# Patient Record
Sex: FEMALE | Race: OTHER | Employment: PART TIME | URBAN - METROPOLITAN AREA
[De-identification: names, ages, dates, MRNs, and addresses within clinical notes are randomized per-mention and may not be internally consistent; named-entity substitution may affect disease eponyms.]

---

## 2017-06-07 ENCOUNTER — HOSPITAL ENCOUNTER (OUTPATIENT)
Dept: LAB | Age: 62
Discharge: HOME | End: 2017-06-07

## 2017-06-07 ENCOUNTER — OFFICE VISIT (OUTPATIENT)
Dept: FAMILY MEDICINE CLINIC | Age: 62
End: 2017-06-07

## 2017-06-07 VITALS
HEIGHT: 65 IN | HEART RATE: 85 BPM | SYSTOLIC BLOOD PRESSURE: 158 MMHG | DIASTOLIC BLOOD PRESSURE: 96 MMHG | BODY MASS INDEX: 24.32 KG/M2 | WEIGHT: 146 LBS | TEMPERATURE: 98.4 F

## 2017-06-07 DIAGNOSIS — M54.32 BILATERAL SCIATICA: Primary | ICD-10-CM

## 2017-06-07 DIAGNOSIS — M54.9 ACUTE MIDLINE BACK PAIN, UNSPECIFIED BACK LOCATION: ICD-10-CM

## 2017-06-07 DIAGNOSIS — M54.31 BILATERAL SCIATICA: ICD-10-CM

## 2017-06-07 DIAGNOSIS — M54.32 BILATERAL SCIATICA: ICD-10-CM

## 2017-06-07 DIAGNOSIS — E03.9 HYPOTHYROIDISM, UNSPECIFIED TYPE: ICD-10-CM

## 2017-06-07 DIAGNOSIS — M54.31 BILATERAL SCIATICA: Primary | ICD-10-CM

## 2017-06-07 LAB
ANION GAP BLD CALC-SCNC: 10 MMOL/L (ref 5–15)
BUN SERPL-MCNC: 12 MG/DL (ref 6–20)
BUN/CREAT SERPL: 15 (ref 12–20)
CALCIUM SERPL-MCNC: 9.5 MG/DL (ref 8.5–10.1)
CHLORIDE SERPL-SCNC: 104 MMOL/L (ref 97–108)
CO2 SERPL-SCNC: 26 MMOL/L (ref 21–32)
CREAT SERPL-MCNC: 0.79 MG/DL (ref 0.55–1.02)
GLUCOSE SERPL-MCNC: 94 MG/DL (ref 65–100)
POTASSIUM SERPL-SCNC: 4.1 MMOL/L (ref 3.5–5.1)
SODIUM SERPL-SCNC: 140 MMOL/L (ref 136–145)
TSH SERPL DL<=0.05 MIU/L-ACNC: 11.7 UIU/ML (ref 0.36–3.74)

## 2017-06-07 PROCEDURE — 84443 ASSAY THYROID STIM HORMONE: CPT | Performed by: NURSE PRACTITIONER

## 2017-06-07 PROCEDURE — 80048 BASIC METABOLIC PNL TOTAL CA: CPT | Performed by: NURSE PRACTITIONER

## 2017-06-07 RX ORDER — PREDNISONE 10 MG/1
TABLET ORAL
Qty: 21 TAB | Refills: 0 | Status: SHIPPED | OUTPATIENT
Start: 2017-06-07 | End: 2017-07-05 | Stop reason: ALTCHOICE

## 2017-06-07 RX ORDER — CYCLOBENZAPRINE HCL 10 MG
10 TABLET ORAL
Qty: 30 TAB | Refills: 0 | Status: SHIPPED | OUTPATIENT
Start: 2017-06-07 | End: 2017-11-30

## 2017-06-07 RX ORDER — LEVOTHYROXINE SODIUM 100 UG/1
100 TABLET ORAL
Qty: 30 TAB | Refills: 3 | Status: SHIPPED | OUTPATIENT
Start: 2017-06-07 | End: 2017-07-05 | Stop reason: SDUPTHER

## 2017-06-07 NOTE — PATIENT INSTRUCTIONS
Ciática: Instrucciones de cuidado - [ Sciatica: Care Instructions ]  Instrucciones de cuidado    La ciática es kamilah irritación de omi de los nervios ciáticos, que salen de la médula willett en la parte baja de la espalda. Los nervios ciáticos y brigida ramificaciones se extienden hacia abajo por la nalga hasta el pie. La ciática puede desarrollarse cuando un disco lesionado en la espalda ejerce presión contra la raíz de un nervio willett. Hilario síntoma principal es dolor, entumecimiento o debilidad que con frecuencia es peor en la pierna o el pie que en la espalda. A menudo la ciática mejora y desaparece con el paso del [place]. Un tratamiento temprano generalmente incluye medicamentos y ejercicios para aliviar el dolor. La atención de seguimiento es kamilah parte clave de hilario tratamiento y seguridad. Asegúrese de hacer y acudir a todas las citas, y llame a hilario médico si está teniendo problemas. También es kamilah buena idea saber los resultados de los exámenes y mantener kamilah lista de los medicamentos que shelia. ¿Cómo puede cuidarse en el hogar? · [employer] analgésicos (medicamentos para el dolor) exactamente tori le fueron indicados. ¨ Si el médico le recetó un analgésico, tómelo según las indicaciones. ¨ Si no está tomando un analgésico recetado, pregúntele a hilario médico si puede gabriel omi de [employer]. · Utilice calor o frío para aliviar el dolor. ¨ Para aplicar calor, póngase kamilah bolsa de agua tibia, kamilah almohadilla térmica a baja temperatura o kamilah compresa tibia sobre la espalda. No se vaya a dormir con kamilah almohadilla térmica sobre la piel. ¨ Para utilizar frío, póngase hielo o kamilah compresa fría sobre la brando mariusz un período de 10 a 20 minutos cada vez. Póngase un paño christian entre el hielo y la piel. · Evite sentarse si es posible, a menos que se sienta mejor que de pie. · Alterne entre recostarse y jessica caminatas cortas. Aumente la distancia que camina tanto tori pueda sin empeorar los síntomas.   · No arden nada que empeore los síntomas. ¿Cuándo debe pedir ayuda? Llame al 911 en cualquier momento que considere que necesita atención de [place]. Por ejemplo, llame si:  · Es completamente incapaz de  kamilah pierna. Llame a childress médico ahora mismo o busque atención médica inmediata si:  · Tiene síntomas nuevos o peores en las piernas o en las nalgas (glúteos). Los síntomas pueden incluir:  ¨ Entumecimiento u hormigueo. ¨ Debilidad. ¨ Dolor. · Pierde el control de la vejiga o del intestino. Preste especial atención a los cambios en childress joe y asegúrese de comunicarse con childress médico si:  · No mejora tori se esperaba. ¿Dónde puede encontrar más información en inglés? Ronald Toro a [email or web address] Porter C077 en la búsqueda para aprender más acerca de \"Ciática: Instrucciones de cuidado - [ Sciatica: Care Instructions ]. \"  Revisado: 23 [place], 2016  Versión del contenido: 11.2  © 1600-5447 Healthwise, Incorporated. Las instrucciones de cuidado fueron adaptadas bajo licencia por [employer] (which disclaims liability or warranty for this information). Si usted tiene [place] afección médica o sobre estas instrucciones, siempre pregunte a childress profesional de joe. Healthwise, Incorporated niega toda garantía o responsabilidad por childress uso de esta información.

## 2017-06-07 NOTE — PROGRESS NOTES
Subjective:     Chief Complaint   Patient presents with    Back Pain     Back pain. Pain radiates to her legs X about1 week        She  is a 64 y.o. female who presents for evaluation of back pain x 1 week. Pt denies any acute injury/MVA nor fall. Pt woke up one AM w/ pain that starts in her C-spine and radiates into her legs. Has been taking \"relaxers\" and NSAIDs/motrin but can't recall the name of the relaxers. Prolonged sitting/standing or activity can provoke the pain. No bowel/bladder changes. Pain approx same since onset. PMH:  hypothryoidism    Surg:  Had coccyx Fx/removal, eyelid lift/repair    NKDA    Current Rx: synthroid 100mcg    Last labs done in Feb before coming to this country. Pt is from [facility]. Pt is not currently working. Pt denies etoh, tobacco nor drug use. Pt is single, 3 children. Last mammogram 2015. Unable to recall last pap, > 3 years        ROS  Gen - no fever/chills  Resp - no dyspnea or cough  CV - no chest pain or LINARES  Rest per HPI    No past medical history on file. No past surgical history on file. No current outpatient prescriptions on file prior to visit. No current facility-administered medications on file prior to visit.          Objective:     Vitals:    06/07/17 1041 06/07/17 1043   BP: (!) 160/92 (!) 158/96   Pulse: 77 85   Temp: 98.4 °F (36.9 °C)    TempSrc: Oral    Weight:  146 lb (66.2 kg)   Height:  5' 5.25\" (1.657 m)       Physical Examination:  General appearance - alert, well appearing, and in no distress  Eyes -sclera anicteric  Neck - supple, no significant adenopathy, no thyromegaly  Chest - clear to auscultation, no wheezes, rales or rhonchi, symmetric air entry  Heart - normal rate, regular rhythm, normal S1, S2, no murmurs, rubs, clicks or gallops  Neurological - alert, oriented, no focal findings or movement disorder noted  Abdomen-BS present/WNL x 4 quads, non-tender/distended, soft,no organomegaly  M/S:  + SI tenderness, gait WNL, tender at L2-L7, upp/low ext strength 5/5    Assessment/ Plan:   Kwadwo York was seen today for back pain. Diagnoses and all orders for this visit:    Bilateral sciatica  -     predniSONE (DELTASONE) 10 mg tablet; 20mg PO BID x 3 days then 10mg PO BID x 3 days then 10mg PO daily then stop. -     cyclobenzaprine (FLEXERIL) 10 mg tablet; Take 1 Tab by mouth nightly. -     METABOLIC PANEL, BASIC; Future    Acute midline back pain, unspecified back location  -     predniSONE (DELTASONE) 10 mg tablet; 20mg PO BID x 3 days then 10mg PO BID x 3 days then 10mg PO daily then stop. -     cyclobenzaprine (FLEXERIL) 10 mg tablet; Take 1 Tab by mouth nightly. Hypothyroidism, unspecified type  -     TSH 3RD GENERATION; Future  -     levothyroxine (SYNTHROID) 100 mcg tablet; Take 1 Tab by mouth Daily (before breakfast). ? Bulge disc r/t sleep position. Prednisone taper and QHS flexeril. Discussed anticipate SEs. Counseled Pt on minimizing NSAID use r/t elevated BP. Check TSH and BMP and resume 100mcg dose. Refer to [facility] for routine pap/mammogram.     Re-eval in 3-4 weeks. I have discussed the diagnosis with the patient and the intended plan as seen in the above orders. The patient has received an after-visit summary and questions were answered concerning future plans. I have discussed medication side effects and warnings with the patient as well. The patient verbalizes understanding and agreement with the plan. Follow-up Disposition:  Return in about 4 weeks (around 7/5/2017).

## 2017-07-05 ENCOUNTER — OFFICE VISIT (OUTPATIENT)
Dept: FAMILY MEDICINE CLINIC | Age: 62
End: 2017-07-05

## 2017-07-05 VITALS
HEART RATE: 80 BPM | TEMPERATURE: 98.4 F | DIASTOLIC BLOOD PRESSURE: 80 MMHG | WEIGHT: 147 LBS | BODY MASS INDEX: 24.27 KG/M2 | SYSTOLIC BLOOD PRESSURE: 155 MMHG

## 2017-07-05 DIAGNOSIS — I73.00 RAYNAUD'S PHENOMENON WITHOUT GANGRENE: Primary | ICD-10-CM

## 2017-07-05 DIAGNOSIS — E03.9 HYPOTHYROIDISM, UNSPECIFIED TYPE: ICD-10-CM

## 2017-07-05 RX ORDER — LEVOTHYROXINE SODIUM 75 UG/1
75 TABLET ORAL
Qty: 30 TAB | Refills: 3 | Status: SHIPPED | OUTPATIENT
Start: 2017-07-05 | End: 2017-11-30 | Stop reason: DRUGHIGH

## 2017-07-05 RX ORDER — AMLODIPINE BESYLATE 5 MG/1
5 TABLET ORAL DAILY
Qty: 30 TAB | Refills: 3 | Status: SHIPPED | OUTPATIENT
Start: 2017-07-05 | End: 2017-11-30

## 2017-07-05 NOTE — MR AVS SNAPSHOT
Visit Information Tamiko Hart Personal Médico Departamento Teléfono del Dep. Número de visita 7/5/2017  2:15 PM Karina Ya, [address] [place] [address]  [phone] Follow-up Instructions Return in about 8 weeks (around 8/30/2017), or if symptoms worsen or fail to improve. Upcoming Health Maintenance Date Due Hepatitis C Screening 1955 DTaP/Tdap/Td series (1 - Tdap) 11/15/1976 PAP AKA CERVICAL CYTOLOGY 11/15/1976 BREAST CANCER SCRN MAMMOGRAM 11/15/2005 FOBT Q 1 YEAR AGE 50-75 11/15/2005 ZOSTER VACCINE AGE 60> 11/15/2015 INFLUENZA AGE 9 TO ADULT 8/1/2017 Alergias  Review Complete El: 7/5/2017 Por: NURIA Funez Clarice del:  7/5/2017 No Known Allergies Vacunas actuales Odette Cools No hay ninguna vacuna archivada. No revisadas esta visita You Were Diagnosed With   
  
 Freddy Car Raynaud's phenomenon without gangrene    -  Primary ICD-10-CM: I73.00 ICD-9-CM: 443.0 Hypothyroidism, unspecified type     ICD-10-CM: E03.9 ICD-9-CM: 244.9 Partes vitales PS Pulso Temperatura Peso (percentil de crecimiento) BMI (IMC) Estado obstétrico  
 155/80 (BP 1 Location: Left arm) 80 98.4 °F (36.9 °C) (Oral) 147 lb (66.7 kg) 24.27 kg/m2 Postmenopausal  
 Estatus de tabaquísmo Never Smoker Historial de signos vitales BMI and BSA Data Body Mass Index Body Surface Area  
 24.27 kg/m 2 1.75 m 2 [facility] Name Phone [facility] [address] [place] [address] Hilario lista de medicamentos actualizada Lista actualizada el: 7/5/17  2:36 PM.  Olga Lidia Harrison use hilario lista de medicamentos más reciente. amLODIPine 5 mg tablet También conocido tori:  Mertzon Cordial Take 1 Tab by mouth daily. cyclobenzaprine 10 mg tablet También conocido tori:  FLEXERIL  
 Take 1 Tab by mouth nightly. levothyroxine 75 mcg tablet También conocido tori:  SYNTHROID Take 1 Tab by mouth Daily (before breakfast). Recetas Enviado a la Iron Refills  
 amLODIPine (NORVASC) 5 mg tablet 3 Sig: Take 1 Tab by mouth daily. Class: Normal  
 Pharmacy: [id number] [place] [facility],[address], [facility] [facility] #: [phone] Route: Oral  
 levothyroxine (SYNTHROID) 75 mcg tablet 3 Sig: Take 1 Tab by mouth Daily (before breakfast). Class: Normal  
 Pharmacy: [address] [place] [facility],[address], [facility] [facility] #: [phone] Route: Oral  
  
Instrucciones de seguimiento Return in about 8 weeks (around 8/30/2017), or if symptoms worsen or fail to improve. Instrucciones para el Paciente Levotriroxina (Por la boca) Trata el hipotiroidismo. También trata la glándula tiroides agrandada y cáncer en la tiroides. Alcira(s) : Levoxyl, Synthroid, Tirosint, Unithroid Existen muchas otras marcas de Dueñas. Nalini medicamento no debe ser usado cuando:  
Nalini medicamento no es adecuado para todas las personas. No lo use si usted ha tenido kamilah reacción alérgica al glicerol o tuvo un ataque al corazón reciente. Flanagan de usar nalini medicamento:  
[place] · [place] brigida medicamentos tori se le haya indicado. Es probable que sea necesario cambiar childress dosis varias veces hasta encontrar la que funciona mejor para usted. Puede que tenga que gabriel Dueñas mariusz 6 a 8 semanas antes de que brigida síntomas comienzan a mejorar. · Shira y siga las instrucciones para el paciente que vienen con el medicamento. Hable con childress médico o farmacéutico si tiene alguna pregunta. · [facility] medicamento por la mañana con el estómago vacío. Espere por lo menos 30 a 60 minutos antes de comer cualquier alimento. · [place]: Tráguela entera. No la mario ni triture. · Solución oral: ¨ Nalini medicamento puede mezclarse con agua o administrarse directamente en la boca. ¨ Para mezclar con agua: Exprima el contenido de 1 ampolla de dosis [place] en un vaso o kamilah taza con agua. Revuelva y tome la mezcla inmediatamente. Añada algo de agua al vaso o la taza y tómela. [place] lo ayudará a que no quede medicamento en el vaso o la taza. No mezcle nalini medicamento con ningún otro líquido excepto agua. No la guarde para usarla más adelante. ¨ Para gabriel sin agua: Exprima el medicamento directamente en la boca o en kamilah Darshan Lavender y tráguelo inmediatamente. · Tableta: Si nalini medicamento se le está dando a un bebé o un lamont pequeño, usted puede triturar la tableta y mezclarla con 1 o 2 cucharaditas (5 a 10 mililitros) de agua. Esta mezcla se puede jessica con kamilah cuchara o un gotero. No mezcle la tableta con ningún otro líquido excepto agua. No guarde la mezcla. Si usted no da el medicamento inmediatamente después de que se [place], tirarlo a la basura. · Si olvida kamilah dosis: Si olvida kamilah dosis de childress medicamento, tómelo lo más pronto posible. Si es jose angel la hora para childress próxima dosis, espere hasta entonces para gabriel childress dosis regular. No use medicamento adicional para reponer la dosis olvidada. · Guarde el medicamento en un recipiente cerrado a temperatura ambiente y alejado del calor, la humedad y la enzo directa. Utilice el líquido oral dentro de los 15 días después de abrir la bolsa. Mantenga las ampollas en la bolsa hasta que usted esté listo para usarlas. Medicamentos y [employer] que debe evitar:  
Consulte con childress médico o farmacéutico antes de usar cualquier medicamento, incluyendo los que compra sin receta médica, las vitaminas y los productos herbales. · Algunos alimentos y medicamentos pueden afectar el funcionamiento de la levotiroxina. Informe a childress médico si está usando cualquiera de los siguientes: ¨ Amiodarona, dexametasona, digoxina, imatinib, ketamina, fenobarbital, rifampicina ¨ Medicamento con betabloqueadores ¨ Anticoagulantes (incluyendo heparina, warfarina) ¨ Insulina o un medicamento para la diabetes ¨ Medicamento para tratar la depresión · Si utiliza antiácidos, medicamentos para tratar el colesterol alto (tori colestiramina, colesevelam, colestipol), orlistat, sevelamer, sucralfato, medicamentos para el estómago (incluyendo lansoprazol, omeprazol, pantoprazol) o cualquier medicamento que contenga calcio o raquel, tómelo por lo menos 4 horas antes o 4 horas después de gabriel levotiroxina. · Harina de semilla de algodón, fibra dietética, la harina de soya (fórmula infantil), o nueces pueden disminuir la absorción de docBeat. Hable con childress médico si tiene preguntas. · No coma toronja ni tome jugo de toronja mientras esté . Precauciones mariusz el uso de Kimi medicamento: · Informe a childress médico si usted está embarazada o amamantando, o si tiene anemia, problemas de coagulación de la isaiah, diabetes, enfermedad cardíaca, osteoporosis, problemas de la glándula pituitaria o suprarrenal. Informe a childress médico si ha recibido recientemente radioterapia con yodo. · Informe a todos los médicos o dentistas que lo atiendan que usted está tomando amberly medicamento. · No suspenda el uso de amberly medicamento súbitamente. Childress médico necesitará disminuir childress dosis poco a poco antes de suspender el medicamento por completo. · El médico solicitará exámenes de laboratorio mariusz las citas de rutina para revisar los efectos de docBeat. Asista a todas brigida citas. · Guarde todos los medicamentos fuera del alcance de los niños. Nunca comparta brigida medicamentos con [facility].  
Efectos secundarios que pueden presentarse mariusz el uso de amberly medicamento:  
Consulte inmediatamente con el médico si nota cualquiera de estos efectos secundarios: 
· Reacción alérgica: Comezón o ronchas, hinchazón del [facility] o [facility], hinchazón u hormigueo en la boca o garganta, opresión en el pecho, dificultad para respirar · Dolor en el pecho que puede extenderse, dificultad para respirar, náuseas, sudoración inusual, desmayos · Ritmo cardíaco acelerado, golpeante o irregular · Convulsiones o temblores · Dolor de nicho severo, visión borrosa o doble, náusea, vómito (en niños) · Cojea al caminar, dolor en la rodilla o la cadera (en los niños) Consulte con el médico si nota los siguientes efectos secundarios menos graves: · Cambios de apetito o peso · Cambios en los periodos menstruales · Pérdida de bety · Nerviosismo, sensibilidad al calor, sudoración Consulte con el médico si nota otros efectos secundarios que brittany son causados por amberly medicamento. Llame a childress médico para consultarle Carla. Usted puede notificar brigida efectos secundarios al FDA al [phone]. © 2017 [facility] Information is for End User's use only and may not be sold, redistributed or otherwise used for commercial purposes. Esta información es sólo para uso en educación. Childress intención no es darle un consejo médico sobre enfermedades o tratamientos. Colsulte con childress Chao Mason farmacéutico antes de seguir cualquier régimen médico para saber si es seguro y efectivo para usted. Amlodipino (Por la boca) Se [place] para tratar la presión arterial ten y la angina (dolor en el pecho). Amberly medicamento es un agente bloqueador del canal de calcio. Alcira(s) : Norvasc Existen muchas otras marcas de Dueñas. Amberly medicamento no debe ser usado cuando:  
Amberly medicamento no es adecuado para todas las personas. No use amberly medicamento si alguna vez ha tenido kamilah reacción alérgica a la amlodipina. Forma de usar amberly medicamento:  
Kuldip Her para disolver · [place] brigida medicamentos tori se le haya indicado.  Es probable que sea necesario cambiar childress dosis varias veces hasta encontrar la que funciona mejor para usted. [employer] a la misma hora todos los días. · Shira y siga las instrucciones para el paciente que vienen con el medicamento. Hable con childress médico o farmacéutico si tiene alguna pregunta. · Si olvida kamilah dosis: [place] la dosis tan pronto tori lo recuerde. Si chung pasado más de 12 horas de la dosis que se supone que usted tome, omita la dosis [place] y tome la próxima dosis a la hora regular. · Guarde el medicamento en un recipiente cerrado a temperatura ambiente y alejado del calor, la humedad y la enzo directa. Medicamentos y [employer] que debe evitar:  
Consulte con childress médico o farmacéutico antes de usar cualquier medicamento, incluyendo los que compra sin receta médica, las vitaminas y los productos herbales. · Algunos medicamentos pueden afectar la eficacia con que actúa la amlodipina. Informe a u médico si usted [employer] alguno de los siguientes medicamentos: ¨ Claritromicina, ciclosporina, diltiazem, itraconazol, ritonavir, sildenafil, simvastatina, tacrolimús Precauciones mariusz el uso de Kimi medicamento: · Informe a childress médico si usted está embarazada o dando de lactar, o si padece de kamilah enfermedad del hígado, enfermedad del corazón, arterioesclerosis coronaria, o estenosis aórtica. · Amberly medicamento puede bajarle demasiado childress presión arterial, especialmente cuando lo use por primera vez o si usted sufre kamilah deshidratación. Si se siente desvanecer o mareado póngase de pie o siéntese lentamente. · Childress médico tendrá que revisar childress progreso y los efectos de amberly medicamento mariusz brigida citas regulares. Cumpla sin falta con todas brigida citas médicas. Asista a todas brigida citas.  
· Aunque se sienta mejor, no suspenda el uso de amberly medicamento sin antes consultar con childress médico. Amberly medicamento no le curará la presión arterial, pascale sí le ayudará a mantenerla dentro de un rango normal. Es probable que necesite gabriel medicamento para la presión arterial por el trinidad de hilario rachel. · Guarde todos los medicamentos fuera del alcance de los niños. Nunca comparta brigida medicamentos con [employer]. Efectos secundarios que pueden presentarse mariusz el uso de amberly medicamento:  
Consulte inmediatamente con el médico si nota cualquiera de estos efectos secundarios: 
· Reacción alérgica: Comezón o ronchas, hinchazón del romi o las april, hinchazón u hormigueo en la boca o garganta, opresión en el pecho, dificultad para respirar · Bartholome Lites · Dolor en el pecho nuevo o que [place] · Inflamación de brigida april, tobillos o pies · Dificultad para respirar, náuseas, sudoración inusual, desmayos Consulte con el médico si nota otros efectos secundarios que brittany son causados por amberly medicamento. Llame a hilario médico para consultarle Carla. [facility] puede notificar brigida efectos secundarios al FDA al [phone]. © 2017 [facility] Information is for End User's use only and may not be sold, redistributed or otherwise used for commercial purposes. Esta información es sólo para uso en educación. Hilario intención no es darle un consejo médico sobre enfermedades o tratamientos. Colsulte con hilario Nikunj Mutter farmacéutico antes de seguir cualquier régimen médico para saber si es seguro y efectivo para usted. Introducing [facility]! Lev Keen introduce portal paciente [place] . Ahora se puede acceder a partes de hilario expediente médico, enviar por correo electrónico la oficina de hilario médico y solicitar renovaciones de medicamentos en línea. En hilario navegador de Internet , Fransisca Ahumada a [email or web address] Domenico tran en el usuario por Mau Contreras? Edgard Garlande tran aquí en la sesión Coral Rosales. Verá la página de registro [place]. Ingrese hilario código de [facility] jose daniel y tori aparece a continuación.  Usted no tendrá que [employer] código después de bassam completado el proceso de registro . Si usted no se inscribe antes de la fecha de caducidad , debe solicitar un nuevo código. · MyChart Código de acceso : [id number] Expires: 10/3/2017  2:36 PM 
 
Ingresa los últimos cuatro dígitos de childress Número de Seguro Social ( xxxx ) y fecha de nacimiento ( dd / mm / aaaa ) tori se indica y domenico clic en Enviar. Usted será llevado a la siguiente página de registro . Crear un ID MyChart . Esta será childress ID de inicio de sesión de MyChart y no puede ser [place] , por lo que pensar en kamilah que es Perlie Pool y fácil de recordar . Crear kamilah contraseña MyChart . Usted puede cambiar childress contraseña en cualquier momento . Ingrese childress Password Reset de preguntas y Pham . [place] se puede utilizar en un momento posterior si usted olvida childress contraseña. Introduzca childress dirección de correo electrónico . Chayito Fernando recibirá kamilah notificación por correo electrónico cuando la nueva información está disponible en MyChart . Deborah newmanic en Registrarse. Stephani Hence collins y descargar porciones de childress expediente médico. 
Domenico clic en el enlace de descarga del menú Resumen para descargar kamilah copia portátil de childress información médica . Si tiene [employer] , por favor visite la sección de preguntas frecuentes del sitio web MyChart . Recuerde, MyChart NO es que se utilizará para las necesidades urgentes. Para emergencias médicas , llame al 911 . Ahora disponible en childress iPhone y Android ! Por favor proporcione amberly resumen de la documentación de cuidado a childress próximo proveedor. If you have any questions after today's visit, please call [phone].

## 2017-07-05 NOTE — PROGRESS NOTES
Subjective:     Chief Complaint   Patient presents with    Back Pain     f/u     Thyroid Problem     f/u        She  is a 64 y.o. female who presents for evaluation of hypothyroidism. Has skipped approx 5-6 doses in last month r/t SEs. Confirms AM dosing w/ only water. Notes some insomnia, continued fatigue and dry mouth w/ US version, stopped taking [place] version last month when new Rx started at [address]. Does not feel drastically improved since making change 1 month ago. Back pain from LOV resolved. Notes chronic white and red skin changes on fingers and hands and cold sensitivity since childhood. No formal Tx/diagnosis. ROS  Gen - no fever/chills  Resp - no dyspnea or cough  CV - no chest pain or LINARES  Rest per HPI    No past medical history on file. No past surgical history on file. Current Outpatient Prescriptions on File Prior to Visit   Medication Sig Dispense Refill    levothyroxine (SYNTHROID) 100 mcg tablet Take 1 Tab by mouth Daily (before breakfast). 30 Tab 3    cyclobenzaprine (FLEXERIL) 10 mg tablet Take 1 Tab by mouth nightly. 30 Tab 0     No current facility-administered medications on file prior to visit. Objective:     Vitals:    07/05/17 1407   BP: 155/80   Pulse: 80   Temp: 98.4 °F (36.9 °C)   TempSrc: Oral   Weight: 147 lb (66.7 kg)       Physical Examination:  General appearance - alert, well appearing, and in no distress  Eyes -sclera anicteric  Neck - supple, no significant adenopathy, no thyromegaly  Chest - clear to auscultation, no wheezes, rales or rhonchi, symmetric air entry  Heart - normal rate, regular rhythm, normal S1, S2, no murmurs, rubs, clicks or gallops  Neurological - alert, oriented, no focal findings or movement disorder noted      Assessment/ Plan:   Gennaro Trevino was seen today for back pain and thyroid problem.     Diagnoses and all orders for this visit:    Raynaud's phenomenon without gangrene  -     amLODIPine (NORVASC) 5 mg tablet; Take 1 Tab by mouth daily. Hypothyroidism, unspecified type  -     levothyroxine (SYNTHROID) 75 mcg tablet; Take 1 Tab by mouth Daily (before breakfast). Adjust dose down to 75mcg daily of synthroid. Defer TSH labs today. If clinically improved at f/u, repeat TSH. Start Tx for suspected raynaud's in lieu of extensive workup given chronic Hx. Re-eval in 6-8 weeks. I have discussed the diagnosis with the patient and the intended plan as seen in the above orders. The patient has received an after-visit summary and questions were answered concerning future plans. I have discussed medication side effects and warnings with the patient as well. The patient verbalizes understanding and agreement with the plan. Follow-up Disposition:  Return in about 8 weeks (around 8/30/2017), or if symptoms worsen or fail to improve.

## 2017-07-05 NOTE — PROGRESS NOTES
Printed AVS, provided to pt and reviewed. Pt indicated understanding and had no questions. Told pt that rx's have been sent to pharmacy and they should be ready for  in approximately 2 hrs. Pt told to please present [employer]Rx. com coupon card which we provided to your pharmacy to receive discounted price. The pt's medication was reviewed with her. The  was Darshan Torres. The pt was told to rtc in 6-8 weeks.  Zander Sebastian RN

## 2017-07-05 NOTE — PROGRESS NOTES
Coordination of Care  1. Have you been to the ER, urgent care clinic since your last visit? Hospitalized since your last visit? No    2. Have you seen or consulted any other health care providers outside of the [address] [place] [address] since your last visit? Include any pap smears or colon screening. No    Medications  Medication Reconciliation Performed: no  Patient does not need refills     Learning Assessment Complete?  yes

## 2017-07-05 NOTE — PATIENT INSTRUCTIONS
Levotriroxina (Por la boca)   Trata el hipotiroidismo. También trata la glándula tiroides agrandada y cáncer en la tiroides. Alcira(s) : Levoxyl, Synthroid, Tirosint, Unithroid   Existen muchas otras marcas de Dueñas. Nalini medicamento no debe ser usado cuando:   Nalini medicamento no es adecuado para todas las personas. No lo use si usted ha tenido kamilah reacción alérgica al glicerol o tuvo un ataque al corazón reciente. Forma de usar nalini medicamento:   [place], [place], [place]  · [place] brigida medicamentos tori se le haya indicado. Es probable que sea necesario cambiar childress dosis varias veces hasta encontrar la que funciona mejor para usted. Puede que tenga que gabriel Dueñas mariusz 6 a 8 semanas antes de que brigida síntomas comienzan a mejorar. · Shira y siga las instrucciones para el paciente que vienen con el medicamento. Hable con childress médico o farmacéutico si tiene alguna pregunta. · [facility] medicamento por la mañana con el estómago vacío. Espere por lo menos 30 a 60 minutos antes de comer cualquier alimento. · [place]: Tráguela entera. No la mario ni triture. · Solución oral:   ¨ Nalini medicamento puede mezclarse con agua o administrarse directamente en la boca. ¨ Para mezclar con agua: Exprima el contenido de 1 ampolla de dosis [place] en un vaso o kamilah taza con agua. Revuelva y tome la mezcla inmediatamente. Añada algo de agua al vaso o la taza y tómela. [place] lo ayudará a que no quede medicamento en el vaso o la taza. No mezcle nalini medicamento con ningún otro líquido excepto agua. No la guarde para usarla más adelante. ¨ Para gabriel sin agua: Exprima el medicamento directamente en la boca o en kamilah [place] y tráguelo inmediatamente. · Tableta: Si nalini medicamento se le está dando a un bebé o un lamont pequeño, usted puede triturar la tableta y mezclarla con 1 o 2 cucharaditas (5 a 10 mililitros) de agua. Esta mezcla se puede jessica con kamilah cuchara o un gotero.  No mezcle la tableta con ningún otro líquido excepto agua. No guarde la mezcla. Si usted no da el medicamento inmediatamente después de que se [place], tirarlo a la basura. · Si olvida kamilah dosis: Si olvida kamilah dosis de childress medicamento, tómelo lo más pronto posible. Si es jose angel la hora para childress próxima dosis, espere hasta entonces para gabriel childress dosis regular. No use medicamento adicional para reponer la dosis olvidada. · Guarde el medicamento en un recipiente cerrado a temperatura ambiente y alejado del calor, la humedad y la enzo directa. Utilice el líquido oral dentro de los 15 días después de abrir la bolsa. Mantenga las ampollas en la bolsa hasta que usted esté listo para usarlas. Medicamentos y [employer] que debe evitar:   Consulte con childress médico o farmacéutico antes de usar cualquier medicamento, incluyendo los que compra sin receta médica, las vitaminas y los productos herbales. · Algunos alimentos y medicamentos pueden afectar el funcionamiento de la levotiroxina. Informe a childress médico si está usando cualquiera de los siguientes:  ¨ Amiodarona, dexametasona, digoxina, imatinib, ketamina, fenobarbital, rifampicina  ¨ Medicamento con betabloqueadores  ¨ Anticoagulantes (incluyendo heparina, warfarina)  ¨ Insulina o un medicamento para la diabetes  ¨ Medicamento para tratar la depresión  · Si utiliza antiácidos, medicamentos para tratar el colesterol alto (tori colestiramina, colesevelam, colestipol), orlistat, sevelamer, sucralfato, medicamentos para el estómago (incluyendo lansoprazol, omeprazol, pantoprazol) o cualquier medicamento que contenga calcio o raquel, tómelo por lo menos 4 horas antes o 4 horas después de gabriel levotiroxina. · Harina de semilla de algodón, fibra dietética, la harina de soya (fórmula infantil), o nueces pueden disminuir la absorción de [place]. Hable con childress médico si tiene preguntas. · No coma toronja ni tome jugo de toronja mientras esté .   Precauciones mariusz el uso de [place] medicamento: · Informe a childress médico si usted está embarazada o amamantando, o si tiene anemia, problemas de coagulación de la isaiah, diabetes, enfermedad cardíaca, osteoporosis, problemas de la glándula pituitaria o suprarrenal. Informe a childress médico si ha recibido recientemente radioterapia con yodo. · Informe a todos los médicos o dentistas que lo atiendan que usted está tomando amberly medicamento. · No suspenda el uso de amberly medicamento súbitamente. Childress médico necesitará disminuir childress dosis poco a poco antes de suspender el medicamento por completo. · El médico solicitará exámenes de laboratorio mariusz las citas de rutina para revisar los efectos de [place]. Asista a todas brigida citas. · Guarde todos los medicamentos fuera del alcance de los niños. Nunca comparta brigida medicamentos con [employer]. Efectos secundarios que pueden presentarse mariusz el uso de amberly medicamento:   Consulte inmediatamente con el médico si nota cualquiera de estos efectos secundarios:  · Reacción alérgica: Comezón o ronchas, hinchazón del romi o las april, hinchazón u hormigueo en la boca o garganta, opresión en el pecho, dificultad para respirar  · Dolor en el pecho que puede extenderse, dificultad para respirar, náuseas, sudoración inusual, desmayos  · Ritmo cardíaco acelerado, golpeante o irregular  · Convulsiones o temblores  · Dolor de nicho severo, visión borrosa o doble, náusea, vómito (en niños)  · Cojea al caminar, dolor en la rodilla o la cadera (en los niños)  Consulte con el médico si nota los siguientes efectos secundarios menos graves:   · Cambios de apetito o peso  · Cambios en los periodos menstruales  · Pérdida de bety  · Nerviosismo, sensibilidad al calor, sudoración  Consulte con el médico si nota otros efectos secundarios que brittany son causados por amberly medicamento. Llame a childress médico para consultarle Carla. Usted puede notificar brigida efectos secundarios al FDA al [phone].   © 2017 [facility] Information is for End User's use only and may not be sold, redistributed or otherwise used for commercial purposes. Esta información es sólo para uso en educación. Hilario intención no es darle un consejo médico sobre enfermedades o tratamientos. Colsulte con hilario [place] farmacéutico antes de seguir cualquier régimen médico para saber si es seguro y efectivo para usted. [place]odip[place] (Por la boca)   Se [place] para tratar la presión arterial ten y la angina (dolor en el pecho). Nalini medicamento es un agente bloqueador del canal de calcio. Alcira(s) : Norvasc   Existen muchas otras marcas de nalini medicamento. Nalini medicamento no debe ser usado cuando:   Nalini medicamento no es adecuado para todas las personas. No use nalini medicamento si alguna vez ha tenido kamilah reacción alérgica a la amlodipina. Forma de usar nalini medicamento:   [place] para disolver  · [place] brigida medicamentos tori se le haya indicado. Es probable que sea necesario cambiar hilario dosis varias veces hasta encontrar la que funciona mejor para usted. [employer] a la misma hora todos los días. · Shira y siga las instrucciones para el paciente que vienen con el medicamento. Hable con hilario médico o farmacéutico si tiene alguna pregunta. · Si olvida kamilah dosis: [place] la dosis tan pronto tori lo recuerde. Si chung pasado más de 12 horas de la dosis que se supone que usted tome, omita la dosis [place] y tome la próxima dosis a la hora regular. · Guarde el medicamento en un recipiente cerrado a temperatura ambiente y alejado del calor, la humedad y la enzo directa. Medicamentos y [employer] que debe evitar:   Consulte con hilario médico o farmacéutico antes de usar cualquier medicamento, incluyendo los que compra sin receta médica, las vitaminas y los productos herbales. · Algunos medicamentos pueden afectar la eficacia con que actúa la amlodipina.  Informe a u médico si usted Matias Payne alguno de los siguientes medicamentos:   ¨ Claritromicina, ciclosporina, diltiazem, itraconazol, ritonavir, sildenafil, simvastatina, tacrolimús  Precauciones mariusz el uso de amberly medicamento:   · Informe a childress médico si usted está embarazada o dando de lactar, o si padece de kamilah enfermedad del hígado, enfermedad del corazón, arterioesclerosis coronaria, o estenosis aórtica. · Amberly medicamento puede bajarle demasiado childress presión arterial, especialmente cuando lo use por primera vez o si usted sufre kamilah deshidratación. Si se siente desvanecer o mareado póngase de pie o siéntese lentamente. · Childress médico tendrá que revisar childress progreso y los efectos de amberly medicamento mariusz brigida citas regulares. Cumpla sin falta con todas brigida citas médicas. Asista a todas brigida citas. · Aunque se sienta mejor, no suspenda el uso de amberly medicamento sin antes consultar con childress médico. Amberly medicamento no le curará la presión arterial, pascale sí le ayudará a mantenerla dentro de un rango normal. Es probable que necesite gabriel medicamento para la presión arterial por el trinidad de childress rachel. · Guarde todos los medicamentos fuera del alcance de los niños. Nunca comparta brigida medicamentos con [facility]. Efectos secundarios que pueden presentarse mariusz el uso de amberly medicamento:   Consulte inmediatamente con el médico si nota cualquiera de estos efectos secundarios:  · Reacción alérgica: Comezón o ronchas, hinchazón del romi o las april, hinchazón u hormigueo en la boca o garganta, opresión en el pecho, dificultad para respirar  · Desvanecimientos, mareos  · Dolor en el pecho nuevo o que empeora  · Inflamación de brigida april, tobillos o pies  · Dificultad para respirar, náuseas, sudoración inusual, desmayos  Consulte con el médico si nota otros efectos secundarios que brittany son causados por amberly medicamento. Llame a childress médico para consultarle Carla. Usted puede notificar brigida efectos secundarios al FDA al [phone].   © 2017 [facility] [address] is for End User's use only and may not be sold, redistributed or otherwise used for commercial purposes. Esta información es sólo para uso en educación. Childress intención no es darle un consejo médico sobre enfermedades o tratamientos. Colsulte con childress [facility] farmacéutico antes de seguir cualquier régimen médico para saber si es seguro y efectivo para usted.

## 2017-11-30 ENCOUNTER — OFFICE VISIT (OUTPATIENT)
Dept: FAMILY MEDICINE CLINIC | Age: 62
End: 2017-11-30

## 2017-11-30 VITALS
SYSTOLIC BLOOD PRESSURE: 142 MMHG | DIASTOLIC BLOOD PRESSURE: 73 MMHG | HEIGHT: 65 IN | HEART RATE: 80 BPM | BODY MASS INDEX: 24.66 KG/M2 | TEMPERATURE: 98.2 F | WEIGHT: 148 LBS

## 2017-11-30 DIAGNOSIS — E03.9 HYPOTHYROIDISM, UNSPECIFIED TYPE: Primary | ICD-10-CM

## 2017-11-30 DIAGNOSIS — I10 ESSENTIAL HYPERTENSION: ICD-10-CM

## 2017-11-30 RX ORDER — LOSARTAN POTASSIUM 50 MG/1
50 TABLET ORAL DAILY
Qty: 30 TAB | Refills: 1 | Status: SHIPPED | OUTPATIENT
Start: 2017-11-30 | End: 2018-02-06

## 2017-11-30 RX ORDER — LEVOTHYROXINE SODIUM 100 UG/1
100 TABLET ORAL
Qty: 30 TAB | Refills: 1 | Status: SHIPPED | OUTPATIENT
Start: 2017-11-30 | End: 2018-03-09 | Stop reason: SDUPTHER

## 2017-11-30 NOTE — PROGRESS NOTES
Coordination of Care  1. Have you been to the ER, urgent care clinic since your last visit? Hospitalized since your last visit? No    2. Have you seen or consulted any other health care providers outside of the [address] [place] [address] since your last visit? Include any pap smears or colon screening. No    Medications  Does the patient need refills? YES    Learning Assessment Complete?  yes

## 2017-11-30 NOTE — PROGRESS NOTES
HISTORY OF PRESENT ILLNESS  Ajit Fontenot is a 58 y.o. female. HPI Comments: Reports she had her meds confiscated on a recent trip to [facility], levothyroxine 100mcg and losarten 50 mcg every day; was on 75mcg at last visit here. Reports she recently had an elevated cortisol level as part of a work-up for the htn, done by a cardiologist in [address] [place] [address]. She showed me the results, which were on her phone-- 123 at 8am (nl less than 23) and 42 at 4pm (nl up to 13). Although there is a lot of upheavel in [address] [place] [address] currently, she reports she was under no stress around the time of the test.  Nl glucose here in the summer, tsh was elevated. Denies any problems with L/D of babies. Hypertension          ROS    Physical Exam   Constitutional: She appears well-developed and well-nourished. No distress. Neck: No thyromegaly present. Cardiovascular: Normal rate, regular rhythm and normal heart sounds. Exam reveals no gallop and no friction rub. No murmur heard. Pulmonary/Chest: Breath sounds normal.   Musculoskeletal: She exhibits no edema. Skin:   Face is not plethoric or round. No buffalo hump. ASSESSMENT and PLAN  1. Hypothyroidism, primary. Restart levothyroxine and then check tsh 2 months. 2.  ?early [place]-- check am cortisol and then f/u 1-2 weeks. htn-- refilled losarten.

## 2017-12-01 ENCOUNTER — HOSPITAL ENCOUNTER (OUTPATIENT)
Dept: LAB | Age: 62
Discharge: HOME | End: 2017-12-01

## 2017-12-01 ENCOUNTER — CLINICAL SUPPORT (OUTPATIENT)
Dept: FAMILY MEDICINE CLINIC | Age: 62
End: 2017-12-01

## 2017-12-01 DIAGNOSIS — I10 HYPERTENSION, UNSPECIFIED TYPE: Primary | ICD-10-CM

## 2017-12-01 DIAGNOSIS — I10 HYPERTENSION, UNSPECIFIED TYPE: ICD-10-CM

## 2017-12-01 LAB — CORTIS AM PEAK SERPL-MCNC: 10.8 UG/DL (ref 4.3–22.4)

## 2017-12-01 PROCEDURE — 84443 ASSAY THYROID STIM HORMONE: CPT | Performed by: FAMILY MEDICINE

## 2017-12-01 PROCEDURE — 82533 TOTAL CORTISOL: CPT | Performed by: FAMILY MEDICINE

## 2017-12-05 ENCOUNTER — OFFICE VISIT (OUTPATIENT)
Dept: FAMILY MEDICINE CLINIC | Age: 62
End: 2017-12-05

## 2017-12-05 VITALS
BODY MASS INDEX: 24.45 KG/M2 | SYSTOLIC BLOOD PRESSURE: 145 MMHG | WEIGHT: 148 LBS | TEMPERATURE: 98.2 F | DIASTOLIC BLOOD PRESSURE: 76 MMHG | HEART RATE: 83 BPM

## 2017-12-05 DIAGNOSIS — I10 HYPERTENSION, UNSPECIFIED TYPE: ICD-10-CM

## 2017-12-05 DIAGNOSIS — E03.9 HYPOTHYROIDISM, UNSPECIFIED TYPE: Primary | ICD-10-CM

## 2017-12-05 LAB — TSH SERPL DL<=0.05 MIU/L-ACNC: 3.4 UIU/ML (ref 0.36–3.74)

## 2017-12-05 NOTE — PROGRESS NOTES
F/u cortisol level. Had been elevated in her country, was nl here. She has no signs or sx, so needs no further w/u at this time. Wonders if she is on the correct dose of l-thyroxine, since she has gained 2 lb since July. Has only missed a fewpills since she was put on this dose of med early sept. I added tsh to lab from last week, and it came back wnl. Pt to f/u 2 months for htn and hypothyroidism.

## 2017-12-05 NOTE — PROGRESS NOTES
Coordination of Care  1. Have you been to the ER, urgent care clinic since your last visit? Hospitalized since your last visit? No    2. Have you seen or consulted any other health care providers outside of the [address] [place] [address] since your last visit? Include any pap smears or colon screening. No    Medications  Does the patient need refills? YES    Learning Assessment Complete?  yes

## 2018-02-06 ENCOUNTER — OFFICE VISIT (OUTPATIENT)
Dept: FAMILY MEDICINE CLINIC | Age: 63
End: 2018-02-06

## 2018-02-06 VITALS
SYSTOLIC BLOOD PRESSURE: 148 MMHG | WEIGHT: 145 LBS | HEART RATE: 106 BPM | BODY MASS INDEX: 24.16 KG/M2 | DIASTOLIC BLOOD PRESSURE: 89 MMHG | TEMPERATURE: 98 F | HEIGHT: 65 IN

## 2018-02-06 DIAGNOSIS — G47.09 OTHER INSOMNIA: ICD-10-CM

## 2018-02-06 DIAGNOSIS — I10 HYPERTENSION, UNSPECIFIED TYPE: Primary | ICD-10-CM

## 2018-02-06 DIAGNOSIS — Z23 ENCOUNTER FOR IMMUNIZATION: ICD-10-CM

## 2018-02-06 DIAGNOSIS — E03.9 HYPOTHYROIDISM, UNSPECIFIED TYPE: ICD-10-CM

## 2018-02-06 RX ORDER — LOSARTAN POTASSIUM AND HYDROCHLOROTHIAZIDE 12.5; 5 MG/1; MG/1
1 TABLET ORAL DAILY
Qty: 30 TAB | Refills: 2 | Status: SHIPPED | OUTPATIENT
Start: 2018-02-06 | End: 2018-03-09 | Stop reason: SINTOL

## 2018-02-06 RX ORDER — HYDROXYZINE 25 MG/1
25 TABLET, FILM COATED ORAL
Qty: 30 TAB | Refills: 2 | Status: SHIPPED | OUTPATIENT
Start: 2018-02-06 | End: 2018-02-16

## 2018-02-06 NOTE — PROGRESS NOTES
Pt completed screening form. No contraindications noted for administering vaccines. Vaccines given per policy. Reviewed possible adverse reaction to vaccines and  Vaccine Information Sheet given to pt in [place], pt received IM flu vaccine today. Suggested she return in October to have flu vaccine, discussed having the vaccine earlier, in the fall. [employer]  #[id number], used for this encounter  No adverse reaction noted at the time of discharge.

## 2018-02-06 NOTE — PROGRESS NOTES
Subjective:     Angelica Cho is a 58 y.o. female who presents for follow up of htn and hypothyroidism, is not due for labs. .    Diet and Lifestyle: exercises regularly, nonsmoker    Cardiovascular ROS: taking medications as instructed, no medication side effects noted, home BP monitoring in range of 132D'J systolic over 96F diastolic. New concerns: poor asleep-- can't get to sleep or stay asleep, early am awakenings. Melatonin didn't help. Denies depression, anxiety or worry. Wants med. Had been given rx for what looks like xanax from a MD in [address] [place] [address] but didn't fill the rx. Also reports chronic non-rad LBP, is not using nsaids or other meds for it. .  Needs to return to her country 3[place]15 for 6 months. Patient Active Problem List   Diagnosis Code    Hypothyroidism E03.9        No results found for: CHOL, CHOLPOCT, HDL, LDL, LDLC, LDLCPOC, LDLCEXT, TRIGL, TGLPOCT, CHHD, CHHDX  Lab Results  Component Value Date/Time   GFR est non-AA >60 06/07/2017 12:11 PM   GFR est AA >60 06/07/2017 12:11 PM   Creatinine 0.79 06/07/2017 12:11 PM   BUN 12 06/07/2017 12:11 PM   Sodium 140 06/07/2017 12:11 PM   Potassium 4.1 06/07/2017 12:11 PM   Chloride 104 06/07/2017 12:11 PM   CO2 26 06/07/2017 12:11 PM          Objective:     Vitals 2/6/2018 12/5/2017 11/30/2017 7/5/2017 6/7/2017 6/7/2017   Blood Pressure 148/89 145/76 142/73 155/80 158/96 160/92   Pulse 106 83 80 80 85 77   Temp 98 98.2 98.2 98.4 - 98.4   Height 5' 5.24\" - 5' 5.236\" - 5' 5.25\" -   Weight 145 lb 148 lb 148 lb 147 lb 146 lb -   BSA 1.74 m2 - 1.76 m2 - 1.75 m2 -   BMI 23.95 kg/m2 - 24.45 kg/m2 - 24.11 kg/m2 -       Appearance: alert, well appearing, and in no distress. General exam: CVS exam BP noted to be mildly elevated today in office, S1, S2 normal, no gallop, no murmur, chest clear, no JVD, no HSM, no edema, nt low back and moves around easily. .  Lab review:     Assessment/Plan:   htn-- add 12.5 mg hctz.  rec meditation or yoga, low na high fruit/veg diet. LBP-- yoga, or other exercises, through [facility]. Tylenol is safe for pain with htn. Insomnia-- yoga, meditation, hydroxyzine.

## 2018-02-06 NOTE — PROGRESS NOTES
ID # [id number]    Chief Complaint   Patient presents with    Thyroid Problem     Coordination of Care  1. Have you been to the ER, urgent care clinic since your last visit? Hospitalized since your last visit? No    2. Have you seen or consulted any other health care providers outside of the [address] [place] [address] since your last visit? Include any pap smears or colon screening. No    Medications  Does the patient need refills? YES    Learning Assessment Complete?  yes

## 2018-03-09 ENCOUNTER — OFFICE VISIT (OUTPATIENT)
Dept: FAMILY MEDICINE CLINIC | Age: 63
End: 2018-03-09

## 2018-03-09 VITALS
SYSTOLIC BLOOD PRESSURE: 123 MMHG | BODY MASS INDEX: 24.61 KG/M2 | HEART RATE: 77 BPM | WEIGHT: 149 LBS | TEMPERATURE: 98 F | DIASTOLIC BLOOD PRESSURE: 62 MMHG

## 2018-03-09 DIAGNOSIS — E03.9 HYPOTHYROIDISM, UNSPECIFIED TYPE: ICD-10-CM

## 2018-03-09 DIAGNOSIS — I10 HYPERTENSION, UNSPECIFIED TYPE: Primary | ICD-10-CM

## 2018-03-09 RX ORDER — LEVOTHYROXINE SODIUM 100 UG/1
100 TABLET ORAL
Qty: 180 TAB | Refills: 1 | Status: SHIPPED | OUTPATIENT
Start: 2018-03-09

## 2018-03-09 RX ORDER — LOSARTAN POTASSIUM 50 MG/1
50 TABLET ORAL DAILY
Qty: 180 TAB | Refills: 1 | Status: SHIPPED | OUTPATIENT
Start: 2018-03-09

## 2018-03-09 NOTE — PROGRESS NOTES
Coordination of Care  1. Have you been to the ER, urgent care clinic since your last visit? Hospitalized since your last visit? No    2. Have you seen or consulted any other health care providers outside of the [address] [place] [address] since your last visit? Include any pap smears or colon screening. No    Does the patient need refills? NO    Learning Assessment Complete?  yes

## 2018-03-09 NOTE — PROGRESS NOTES
Subjective:     Yohan Salgado is a 58 y.o. female who presents for follow up of htn. I added hctz to her losarten and she has had several episodes of feeling faint and sweating. Checked bp once and it was about 100/70. Still needs to go back to [place] next week and return in 6 months, wants 6 months of med. Also needs refill on l-thyroxine. New concerns: need note for crossing into [place] stating medical problems and meds. .     Patient Active Problem List   Diagnosis Code    Hypothyroidism E03.9    Essential hypertension I10     No family history on file. Social History   Substance Use Topics    Smoking status: Never Smoker    Smokeless tobacco: Never Used    Alcohol use No        No results found for: CHOL, CHOLPOCT, HDL, LDL, LDLC, LDLCPOC, LDLCEXT, TRIGL, TGLPOCT, CHHD, [facility]  Lab Results  Component Value Date/Time   GFR est non-AA >60 06/07/2017 12:11 PM   GFR est AA >60 06/07/2017 12:11 PM   Creatinine 0.79 06/07/2017 12:11 PM   BUN 12 06/07/2017 12:11 PM   Sodium 140 06/07/2017 12:11 PM   Potassium 4.1 06/07/2017 12:11 PM   Chloride 104 06/07/2017 12:11 PM   CO2 26 06/07/2017 12:11 PM          Objective:     Vitals 3/9/2018 2/6/2018 12/5/2017 11/30/2017 7/5/2017 6/7/2017 6/7/2017   Blood Pressure 123/62 148/89 145/76 142/73 155/80 158/96 160/92   Pulse 77 106 83 80 80 85 77   Temp 98 98 98.2 98.2 98.4 - 98.4   Height - 5' 5.24\" - 5' 5.236\" - 5' 5.25\" -   Weight 149 lb 145 lb 148 lb 148 lb 147 lb 146 lb -   BSA - 1.74 m2 - 1.76 m2 - 1.75 m2 -   BMI - 23.95 kg/m2 - 24.45 kg/m2 - 24.11 kg/m2 -       Appearance: alert, well appearing, and in no distress. General exam: CVS exam BP noted to be well controlled today in office, S1, S2 normal, no gallop, no murmur, chest clear, no JVD, no HSM, no edema. Lab review:     Assessment/Plan:     hypertension possibly overcontrolled. She may run higher bp here than when at home. Will d/c the hctz component of med, rx 6 months of losarten.   Needs to check bp on the outside and told her we want it to be under 140/88, with no sx of not feeling well. Hypothyroidism-- last tsh ok 12/17. Refilled l-thyroxine for 6 months. F/u at that time. Letter written for border control in [facility]. Carol Alejandro

## 2019-02-04 RX ORDER — LEVOTHYROXINE SODIUM 100 UG/1
TABLET ORAL
Qty: 180 TAB | Refills: 1 | OUTPATIENT
Start: 2019-02-04

## 2019-02-04 NOTE — TELEPHONE ENCOUNTER
Needs appt. Has been out of meds over 6 months and last saw her a year ago, no labs for over a year.

## 2019-02-18 NOTE — TELEPHONE ENCOUNTER
Routing message to call back registrar to please schedule pt for an appointment per message from Dr. Alex Rodriguez.  Dima Bahena RN

## 2019-02-25 NOTE — TELEPHONE ENCOUNTER
Patient was contacted to schedule follow up appointment 2/25/19 Patient gabriel barnes says patient is out of the country at the moment and that patient is going to come back at the end of May 2019.     Ana Bobby

## 2019-10-18 ENCOUNTER — TELEPHONE (OUTPATIENT)
Dept: FAMILY MEDICINE CLINIC | Age: 64
End: 2019-10-18

## 2019-11-08 NOTE — TELEPHONE ENCOUNTER
Int # [id number]. Returned the pt's pc. Pt's  dtr in law answered the call. The pt's Dtr in law is not on the PHI. The pt had asked to talk to a nurse. The Drt in law was asked if the pt could talk on the phone. She stated she could but she did not have an phone. The Dtr in law stated she would take the phone to the pt and let her use it. The dtr in law asked the nurse to call the pt on her phone in 5 min.  Michael Morataya RN
Jose Roberto Leon  Has been contacted 11/8/19    @ 2:21 pm patient was not available at that moment daughter in law answer phone call and say that she would call back .     Thank you
[facility] to know if she can have a F/U apt . Patient would like to talk to a nurse about .     Thank you
Routing back to call back reg. Pt was a [facility] pt and has not been seen since March of 2018. Please offer her an appointment at the [facility] if she does not have a new provider but do not overbook her. She can make the line if she wants to be seen as well before there is an appointment.  Nadya Sood RN
Steph Buitragoamandeep   called main  office wanted to  Know if she can have a f/u apt . Patient insisted that she used to see Doctor Maegan Lopez and that is important for her to see a provider . ..   Thank you
Tc to the pt. Int # [id number]. The pt call was disconnected before the int. Could get on the line. The  re-dialed the pt's phone number. There was no answer. A message was left to return the call to the C[facility].  Jerardo Jorge RN
normal...